# Patient Record
Sex: MALE | Race: WHITE | NOT HISPANIC OR LATINO | RURAL
[De-identification: names, ages, dates, MRNs, and addresses within clinical notes are randomized per-mention and may not be internally consistent; named-entity substitution may affect disease eponyms.]

---

## 2019-12-19 ENCOUNTER — HISTORICAL (OUTPATIENT)
Dept: ADMINISTRATIVE | Facility: HOSPITAL | Age: 54
End: 2019-12-19

## 2019-12-24 LAB
LAB AP CLINICAL INFORMATION: NORMAL
LAB AP COMMENTS: NORMAL
LAB AP DIAGNOSIS - HISTORICAL: NORMAL
LAB AP GROSS PATHOLOGY - HISTORICAL: NORMAL
LAB AP SPECIMEN SUBMITTED - HISTORICAL: NORMAL

## 2020-08-23 ENCOUNTER — HISTORICAL (OUTPATIENT)
Dept: ADMINISTRATIVE | Facility: HOSPITAL | Age: 55
End: 2020-08-23

## 2020-08-23 LAB — SARS-COV+SARS-COV-2 AG RESP QL IA.RAPID: NEGATIVE

## 2020-09-03 ENCOUNTER — HISTORICAL (OUTPATIENT)
Dept: ADMINISTRATIVE | Facility: HOSPITAL | Age: 55
End: 2020-09-03

## 2020-09-08 LAB

## 2020-09-24 ENCOUNTER — HISTORICAL (OUTPATIENT)
Dept: ADMINISTRATIVE | Facility: HOSPITAL | Age: 55
End: 2020-09-24

## 2020-09-28 LAB
INSULIN SERPL-ACNC: 1 U[IU]/ML
INSULIN SERPL-ACNC: NORMAL U[IU]/ML
LAB AP CLINICAL INFORMATION: NORMAL
LAB AP DIAGNOSIS - HISTORICAL: NORMAL
LAB AP GROSS PATHOLOGY - HISTORICAL: NORMAL
LAB AP SPECIMEN SUBMITTED - HISTORICAL: NORMAL

## 2020-10-27 ENCOUNTER — HISTORICAL (OUTPATIENT)
Dept: ADMINISTRATIVE | Facility: HOSPITAL | Age: 55
End: 2020-10-27

## 2020-10-29 LAB
INSULIN SERPL-ACNC: NORMAL U[IU]/ML
LAB AP CLINICAL INFORMATION: NORMAL
LAB AP COMMENTS: NORMAL
LAB AP DIAGNOSIS - HISTORICAL: NORMAL
LAB AP GROSS PATHOLOGY - HISTORICAL: NORMAL
LAB AP SPECIMEN SUBMITTED - HISTORICAL: NORMAL

## 2020-10-30 LAB
REPORT: NORMAL

## 2020-11-20 ENCOUNTER — HISTORICAL (OUTPATIENT)
Dept: ADMINISTRATIVE | Facility: HOSPITAL | Age: 55
End: 2020-11-20

## 2020-11-20 LAB — SARS-COV+SARS-COV-2 AG RESP QL IA.RAPID: NEGATIVE

## 2023-06-29 ENCOUNTER — OFFICE VISIT (OUTPATIENT)
Dept: DERMATOLOGY | Facility: CLINIC | Age: 58
End: 2023-06-29
Payer: OTHER GOVERNMENT

## 2023-06-29 DIAGNOSIS — Z85.828 HISTORY OF NONMELANOMA SKIN CANCER: ICD-10-CM

## 2023-06-29 DIAGNOSIS — L28.1 PRURIGO NODULARIS: Primary | ICD-10-CM

## 2023-06-29 PROCEDURE — 99214 PR OFFICE/OUTPT VISIT, EST, LEVL IV, 30-39 MIN: ICD-10-PCS | Mod: ,,, | Performed by: STUDENT IN AN ORGANIZED HEALTH CARE EDUCATION/TRAINING PROGRAM

## 2023-06-29 PROCEDURE — 99214 OFFICE O/P EST MOD 30 MIN: CPT | Mod: ,,, | Performed by: STUDENT IN AN ORGANIZED HEALTH CARE EDUCATION/TRAINING PROGRAM

## 2023-06-29 RX ORDER — DILTIAZEM HYDROCHLORIDE 120 MG/1
120 CAPSULE, EXTENDED RELEASE ORAL DAILY
COMMUNITY
End: 2023-06-29

## 2023-06-29 RX ORDER — LAMOTRIGINE 150 MG/1
300 TABLET ORAL
COMMUNITY
Start: 2023-04-24 | End: 2023-08-22

## 2023-06-29 RX ORDER — ATORVASTATIN CALCIUM 20 MG/1
20 TABLET, FILM COATED ORAL DAILY
COMMUNITY

## 2023-06-29 RX ORDER — ZONISAMIDE 100 MG/1
100 CAPSULE ORAL
COMMUNITY

## 2023-06-29 RX ORDER — TRIAMCINOLONE ACETONIDE 1 MG/G
OINTMENT TOPICAL 2 TIMES DAILY
Qty: 80 G | Refills: 0 | Status: SHIPPED | OUTPATIENT
Start: 2023-06-29

## 2023-06-29 RX ORDER — DILTIAZEM HYDROCHLORIDE 120 MG/1
120 TABLET, FILM COATED ORAL EVERY 12 HOURS
COMMUNITY

## 2023-06-29 RX ORDER — LEVETIRACETAM 750 MG/1
TABLET ORAL
COMMUNITY
Start: 2023-04-10

## 2023-06-29 NOTE — PROGRESS NOTES
[facility]  Tyrel Omer MD    [address] [place] [id number]  [phone]    Fax: [phone]    Patient Name: Jerson Wisdom  Medical Record Number: [id number]  PCP: Primary Doctor No  Age: 58 y.o. : 1965  Contact: [phone] (home)     CC: wart on upper lip  History of Present Illness:     Jerson Wisdom is a 58 y.o.  male with no history of skin cancer ( BCC left forearm 2 years ago s/p excision with Dr. Cruz) who presents to clinic today for wart on upper lip. It has been present 2 years. Symptoms include growth. It has been treated with LN2 in the past and shave biopsied x 2. He frequently rubs the area.     The patient has no other concerns today.    Review of Systems:     Unremarkable other than mentioned above.     Physical Exam:     General: Relaxed, oriented, alert    Skin examination of the scalp, face, neck, chest, back, abdomen, upper extremities and lower extremities were normal except for as listed below      Assessment and Plan:     1. Prurigo Nodule  - excoriated scaly nodule upper lip    Plan:   - topical steroid: TAC 0.1% ointment x 6 weeks then reassess for possible underlying verruca or SCC       2. History of NMSC   Well-healed scar on left forearm  No e/o recurrence   Recommend sunscreen and good photoprotection         Return to clinic in 6-8 weeks.     AVS printed with patient instructions     Tyrel Omer MD   Mohs Surgery/Dermatologic Oncology  Dermatology

## 2023-08-22 ENCOUNTER — OFFICE VISIT (OUTPATIENT)
Dept: DERMATOLOGY | Facility: CLINIC | Age: 58
End: 2023-08-22
Payer: OTHER GOVERNMENT

## 2023-08-22 DIAGNOSIS — L98.9 SKIN LESION: Primary | ICD-10-CM

## 2023-08-22 PROCEDURE — 99213 OFFICE O/P EST LOW 20 MIN: CPT | Mod: ,,, | Performed by: STUDENT IN AN ORGANIZED HEALTH CARE EDUCATION/TRAINING PROGRAM

## 2023-08-22 PROCEDURE — 99213 PR OFFICE/OUTPT VISIT, EST, LEVL III, 20-29 MIN: ICD-10-PCS | Mod: ,,, | Performed by: STUDENT IN AN ORGANIZED HEALTH CARE EDUCATION/TRAINING PROGRAM

## 2023-08-22 RX ORDER — LEVETIRACETAM 750 MG/1
TABLET ORAL
COMMUNITY
End: 2023-08-22

## 2023-08-22 RX ORDER — LAMOTRIGINE 200 MG/1
TABLET ORAL
COMMUNITY

## 2023-08-22 RX ORDER — ASPIRIN 81 MG/1
TABLET ORAL
COMMUNITY

## 2023-08-22 NOTE — PROGRESS NOTES
[facility]  Tyrel Omer MD    [facility] [place] [address]  [phone]    Fax: [phone]    Patient Name: Jerson Wisdom  Medical Record Number: [id number]  PCP: Alayna, Primary Doctor  Age: 58 y.o. : 1965  Contact: [phone] (home)     History of Present Illness:     Jerson Wisdom is a 58 y.o.  male here for follow up of prurigo nodularis that was treated with TAC ointment bid with no improvement.     The patient has no other concerns today.    Review of Systems:     Unremarkable other than mentioned above.     Physical Exam:     General: Relaxed, oriented, alert    Skin examination of the scalp, face, neck, chest, back, abdomen, upper extremities and lower extremities were normal except for as listed below      Assessment and Plan:     1. Wart vs LSC vs PN of upper lip   - will continue to monitor for now as it has not grown  - pt declines 3rd biopsy today, will consider if lesion worsens      Return to clinic in PRN.     AVS printed with patient instructions     Tyrel Omer MD   Mohs Surgery/Dermatologic Oncology  Dermatology